# Patient Record
Sex: FEMALE | Race: WHITE | Employment: UNEMPLOYED | URBAN - METROPOLITAN AREA
[De-identification: names, ages, dates, MRNs, and addresses within clinical notes are randomized per-mention and may not be internally consistent; named-entity substitution may affect disease eponyms.]

---

## 2023-05-23 ENCOUNTER — HOSPITAL ENCOUNTER (OUTPATIENT)
Age: 53
Setting detail: SPECIMEN
Discharge: HOME | End: 2023-05-23

## 2023-05-23 LAB
ALBUMIN SERPL-MCNC: 4.4 G/DL (ref 3.5–5.2)
ALBUMIN/GLOB SERPL: 1.6 {RATIO} (ref 1–2.5)
ALP SERPL-CCNC: 99 U/L (ref 35–104)
ALT SERPL-CCNC: 18 U/L (ref 5–33)
ANION GAP SERPL CALCULATED.3IONS-SCNC: 14 MMOL/L (ref 9–17)
AST SERPL-CCNC: 21 U/L
BASOPHILS # BLD: 0.04 K/UL (ref 0–0.2)
BASOPHILS NFR BLD: 1 % (ref 0–2)
BILIRUB SERPL-MCNC: 0.7 MG/DL (ref 0.3–1.2)
BUN SERPL-MCNC: 16 MG/DL (ref 6–20)
CALCIUM SERPL-MCNC: 9.9 MG/DL (ref 8.6–10.4)
CHLORIDE SERPL-SCNC: 107 MMOL/L (ref 98–107)
CHOLEST SERPL-MCNC: 244 MG/DL
CHOLESTEROL/HDL RATIO: 3.2
CO2 SERPL-SCNC: 21 MMOL/L (ref 20–31)
CREAT SERPL-MCNC: 1.08 MG/DL (ref 0.5–0.9)
EOSINOPHIL # BLD: 0.08 K/UL (ref 0–0.44)
EOSINOPHILS RELATIVE PERCENT: 2 % (ref 1–4)
ERYTHROCYTE [DISTWIDTH] IN BLOOD BY AUTOMATED COUNT: 13.1 % (ref 11.8–14.4)
GFR SERPL CREATININE-BSD FRML MDRD: >60 ML/MIN/1.73M2
GLUCOSE SERPL-MCNC: 83 MG/DL (ref 70–99)
HCT VFR BLD AUTO: 48 % (ref 36.3–47.1)
HDLC SERPL-MCNC: 76 MG/DL
HGB BLD-MCNC: 15.5 G/DL (ref 11.9–15.1)
IMM GRANULOCYTES # BLD AUTO: <0.03 K/UL (ref 0–0.3)
IMM GRANULOCYTES NFR BLD: 0 %
LDLC SERPL CALC-MCNC: 151 MG/DL (ref 0–130)
LYMPHOCYTES # BLD: 33 % (ref 24–43)
LYMPHOCYTES NFR BLD: 1.25 K/UL (ref 1.1–3.7)
MAGNESIUM SERPL-MCNC: 2.3 MG/DL (ref 1.6–2.6)
MCH RBC QN AUTO: 29.5 PG (ref 25.2–33.5)
MCHC RBC AUTO-ENTMCNC: 32.3 G/DL (ref 28.4–34.8)
MCV RBC AUTO: 91.3 FL (ref 82.6–102.9)
MONOCYTES NFR BLD: 0.34 K/UL (ref 0.1–1.2)
MONOCYTES NFR BLD: 9 % (ref 3–12)
NEUTROPHILS NFR BLD: 55 % (ref 36–65)
NEUTS SEG NFR BLD: 2.08 K/UL (ref 1.5–8.1)
NRBC AUTOMATED: 0 PER 100 WBC
PLATELET # BLD AUTO: 231 K/UL (ref 138–453)
PMV BLD AUTO: 9.9 FL (ref 8.1–13.5)
POTASSIUM SERPL-SCNC: 4.4 MMOL/L (ref 3.7–5.3)
PROT SERPL-MCNC: 7.2 G/DL (ref 6.4–8.3)
RBC # BLD AUTO: 5.26 M/UL (ref 3.95–5.11)
SODIUM SERPL-SCNC: 142 MMOL/L (ref 135–144)
TRIGL SERPL-MCNC: 83 MG/DL
WBC OTHER # BLD: 3.8 K/UL (ref 3.5–11.3)

## 2023-08-14 DIAGNOSIS — G43.119 INTRACTABLE MIGRAINE WITH AURA WITHOUT STATUS MIGRAINOSUS: ICD-10-CM

## 2023-08-14 DIAGNOSIS — M51.26 PROTRUDED LUMBAR DISC: ICD-10-CM

## 2023-08-14 DIAGNOSIS — I10 ESSENTIAL HYPERTENSION: Primary | ICD-10-CM

## 2023-08-14 DIAGNOSIS — Q76.49 SPINAL ASYMMETRY (< 10 DEGREES): ICD-10-CM

## 2023-08-14 DIAGNOSIS — G43.109 MIGRAINE AURA WITHOUT HEADACHE: ICD-10-CM

## 2023-08-14 RX ORDER — LOSARTAN POTASSIUM 100 MG/1
100 TABLET ORAL DAILY
COMMUNITY
End: 2023-08-17 | Stop reason: SDUPTHER

## 2023-08-14 RX ORDER — LANOLIN ALCOHOL/MO/W.PET/CERES
400 CREAM (GRAM) TOPICAL DAILY
COMMUNITY
End: 2023-08-17 | Stop reason: SDUPTHER

## 2023-08-17 PROBLEM — M51.26 PROTRUDED LUMBAR DISC: Status: ACTIVE | Noted: 2023-08-17

## 2023-08-17 PROBLEM — Q76.49 SPINAL ASYMMETRY (< 10 DEGREES): Status: ACTIVE | Noted: 2023-08-17

## 2023-08-17 PROBLEM — G43.119 INTRACTABLE MIGRAINE WITH AURA WITHOUT STATUS MIGRAINOSUS: Status: ACTIVE | Noted: 2023-08-17

## 2023-08-17 PROBLEM — G43.109 MIGRAINE AURA WITHOUT HEADACHE: Status: ACTIVE | Noted: 2023-08-17

## 2023-08-17 RX ORDER — LANOLIN ALCOHOL/MO/W.PET/CERES
400 CREAM (GRAM) TOPICAL DAILY
Qty: 90 TABLET | Refills: 0 | Status: SHIPPED | OUTPATIENT
Start: 2023-08-17

## 2023-08-17 RX ORDER — LOSARTAN POTASSIUM 100 MG/1
100 TABLET ORAL DAILY
Qty: 90 TABLET | Refills: 0 | Status: SHIPPED | OUTPATIENT
Start: 2023-08-17

## 2023-08-17 RX ORDER — SUMATRIPTAN 100 MG/1
1 TABLET, FILM COATED ORAL
COMMUNITY
Start: 2023-07-27

## 2023-08-17 RX ORDER — TOPIRAMATE 50 MG/1
50 TABLET, FILM COATED ORAL 2 TIMES DAILY
COMMUNITY
Start: 2023-07-27

## 2023-08-17 NOTE — TELEPHONE ENCOUNTER
Refill sent. Pt was advised to come to the office for follow-up of labs in May, but was not scheduled. Please call pt and schedule her for an appointment for HTN routine follow-up/labs as soon as possible.

## 2023-10-27 ENCOUNTER — OFFICE VISIT (OUTPATIENT)
Age: 53
End: 2023-10-27
Payer: COMMERCIAL

## 2023-10-27 VITALS
RESPIRATION RATE: 16 BRPM | WEIGHT: 118 LBS | HEIGHT: 64 IN | HEART RATE: 62 BPM | TEMPERATURE: 97.7 F | BODY MASS INDEX: 20.14 KG/M2 | SYSTOLIC BLOOD PRESSURE: 151 MMHG | DIASTOLIC BLOOD PRESSURE: 89 MMHG

## 2023-10-27 DIAGNOSIS — Z12.11 SCREENING FOR COLON CANCER: ICD-10-CM

## 2023-10-27 DIAGNOSIS — R19.8 RECTAL PRESSURE: Primary | ICD-10-CM

## 2023-10-27 PROCEDURE — G8420 CALC BMI NORM PARAMETERS: HCPCS

## 2023-10-27 PROCEDURE — 99211 OFF/OP EST MAY X REQ PHY/QHP: CPT

## 2023-10-27 PROCEDURE — G8484 FLU IMMUNIZE NO ADMIN: HCPCS

## 2023-10-27 PROCEDURE — G8427 DOCREV CUR MEDS BY ELIG CLIN: HCPCS

## 2023-10-27 PROCEDURE — 99213 OFFICE O/P EST LOW 20 MIN: CPT

## 2023-10-27 PROCEDURE — 3017F COLORECTAL CA SCREEN DOC REV: CPT

## 2023-10-27 PROCEDURE — 1036F TOBACCO NON-USER: CPT

## 2023-10-27 RX ORDER — VITAMIN B COMPLEX
1000 TABLET ORAL DAILY
COMMUNITY

## 2023-10-27 SDOH — ECONOMIC STABILITY: HOUSING INSECURITY
IN THE LAST 12 MONTHS, WAS THERE A TIME WHEN YOU DID NOT HAVE A STEADY PLACE TO SLEEP OR SLEPT IN A SHELTER (INCLUDING NOW)?: NO

## 2023-10-27 SDOH — ECONOMIC STABILITY: FOOD INSECURITY: WITHIN THE PAST 12 MONTHS, THE FOOD YOU BOUGHT JUST DIDN'T LAST AND YOU DIDN'T HAVE MONEY TO GET MORE.: NEVER TRUE

## 2023-10-27 SDOH — ECONOMIC STABILITY: FOOD INSECURITY: WITHIN THE PAST 12 MONTHS, YOU WORRIED THAT YOUR FOOD WOULD RUN OUT BEFORE YOU GOT MONEY TO BUY MORE.: NEVER TRUE

## 2023-10-27 SDOH — ECONOMIC STABILITY: INCOME INSECURITY: HOW HARD IS IT FOR YOU TO PAY FOR THE VERY BASICS LIKE FOOD, HOUSING, MEDICAL CARE, AND HEATING?: NOT HARD AT ALL

## 2023-10-27 ASSESSMENT — ENCOUNTER SYMPTOMS
ANAL BLEEDING: 0
DIARRHEA: 0
BLOOD IN STOOL: 0
VOMITING: 0
SHORTNESS OF BREATH: 0
RECTAL PAIN: 1
ABDOMINAL PAIN: 0
NAUSEA: 0
CONSTIPATION: 0

## 2023-10-27 ASSESSMENT — PATIENT HEALTH QUESTIONNAIRE - PHQ9
1. LITTLE INTEREST OR PLEASURE IN DOING THINGS: 0
SUM OF ALL RESPONSES TO PHQ QUESTIONS 1-9: 0
SUM OF ALL RESPONSES TO PHQ QUESTIONS 1-9: 0
2. FEELING DOWN, DEPRESSED OR HOPELESS: 0
SUM OF ALL RESPONSES TO PHQ QUESTIONS 1-9: 0
SUM OF ALL RESPONSES TO PHQ9 QUESTIONS 1 & 2: 0
SUM OF ALL RESPONSES TO PHQ QUESTIONS 1-9: 0

## 2023-10-27 NOTE — PROGRESS NOTES
[address] [facility]  [address] [facility] [place]  Phone: [phone]  Fax: [phone]      Date of Visit:  10/31/2023  Patient Name: Jay Jay Upton   Patient :  1970     HPI:     Jay Jay Upton is a 52 y.o. female with a past medication history of HTN, migraines and vitamin D deficiency who presents today to discuss   Chief Complaint   Patient presents with    Hemorrhoids     Burning after BM, not constipated, no bleed seen- started 3 weeks ago, tried Preparation H, do feel a lump there     Patient is complaining of intermittent burning after bowel movements and rectal pain/pressure with prolonged sitting that started about three weeks ago after moving furniture. Symptoms are intermittent and mostly occur during the day. She denies any unintentional weight loss, appetite changes, fevers, chills, abdominal pain, nausea, vomiting, perianal itching, constipation, diarrhea or blood in stool. Other relevant history include two recent long car rides (4 hours +). OTC Preparation H provided mild relief, but symptoms soon reoccurred after she went running. Patient has never had a screening colonoscopy. I personally reviewed the patient's past medical history, current medications, allergies, surgical history, family history and social history. Updates were made as necessary. REVIEW OF SYSTEM      Review of Systems   Constitutional:  Negative for appetite change, chills, fever and unexpected weight change. Respiratory:  Negative for shortness of breath. Cardiovascular:  Negative for chest pain. Gastrointestinal:  Positive for rectal pain (described as intermittent \"burning\" and pressure; uncomfortable to sit). Negative for abdominal pain, anal bleeding, blood in stool, constipation, diarrhea, nausea and vomiting. All other systems reviewed and are negative.       REVIEWED INFORMATION      No Known Allergies    Patient Active

## 2023-10-27 NOTE — PATIENT INSTRUCTIONS
Thank you for following up with us at [facility] outpatient residency clinic! It was a pleasure to meet you today! Our plan is the following:  - I refer you to Dr. Sawyer Washington, Colorectal & General Surgery, to evaluate for hemorrhoids.   - You are also due for a screening colonoscopy which will be included in the referral.  - In the meantime, please continue taking stool softeners and warm sitz baths to help relieve the discomfort. If you have any additional questions or concerns, please call the office ([phone]) and speak to one of the staff. They will triage and forward the message to the doctors! Have a great rest of your day!

## 2023-12-08 ENCOUNTER — HOSPITAL ENCOUNTER (OUTPATIENT)
Age: 53
Setting detail: SPECIMEN
Discharge: HOME | End: 2023-12-08

## 2023-12-08 DIAGNOSIS — E78.2 MIXED HYPERLIPIDEMIA: ICD-10-CM

## 2023-12-08 DIAGNOSIS — N28.9 ABNORMAL RENAL FUNCTION FINDING: ICD-10-CM

## 2023-12-08 DIAGNOSIS — I10 ESSENTIAL HYPERTENSION: ICD-10-CM

## 2023-12-08 LAB
BASOPHILS # BLD: 0.04 K/UL (ref 0–0.2)
BASOPHILS NFR BLD: 1 % (ref 0–2)
EOSINOPHIL # BLD: 0.04 K/UL (ref 0–0.44)
EOSINOPHILS RELATIVE PERCENT: 1 % (ref 1–4)
ERYTHROCYTE [DISTWIDTH] IN BLOOD BY AUTOMATED COUNT: 12.3 % (ref 11.8–14.4)
HCT VFR BLD AUTO: 46.3 % (ref 36.3–47.1)
HGB BLD-MCNC: 15.6 G/DL (ref 11.9–15.1)
IMM GRANULOCYTES # BLD AUTO: <0.03 K/UL (ref 0–0.3)
IMM GRANULOCYTES NFR BLD: 0 %
LYMPHOCYTES NFR BLD: 1.11 K/UL (ref 1.1–3.7)
LYMPHOCYTES RELATIVE PERCENT: 29 % (ref 24–43)
MCH RBC QN AUTO: 30.2 PG (ref 25.2–33.5)
MCHC RBC AUTO-ENTMCNC: 33.7 G/DL (ref 28.4–34.8)
MCV RBC AUTO: 89.6 FL (ref 82.6–102.9)
MONOCYTES NFR BLD: 0.41 K/UL (ref 0.1–1.2)
MONOCYTES NFR BLD: 11 % (ref 3–12)
NEUTROPHILS NFR BLD: 58 % (ref 36–65)
NEUTS SEG NFR BLD: 2.21 K/UL (ref 1.5–8.1)
NRBC BLD-RTO: 0 PER 100 WBC
PLATELET # BLD AUTO: 229 K/UL (ref 138–453)
PMV BLD AUTO: 10.2 FL (ref 8.1–13.5)
RBC # BLD AUTO: 5.17 M/UL (ref 3.95–5.11)
WBC OTHER # BLD: 3.8 K/UL (ref 3.5–11.3)

## 2023-12-09 LAB
ALBUMIN SERPL-MCNC: 4.3 G/DL (ref 3.5–5.2)
ALBUMIN/GLOB SERPL: 1.5 {RATIO} (ref 1–2.5)
ALP SERPL-CCNC: 105 U/L (ref 35–104)
ALT SERPL-CCNC: 24 U/L (ref 5–33)
ANION GAP SERPL CALCULATED.3IONS-SCNC: 10 MMOL/L (ref 9–17)
AST SERPL-CCNC: 27 U/L
BILIRUB SERPL-MCNC: 0.6 MG/DL (ref 0.3–1.2)
BUN SERPL-MCNC: 15 MG/DL (ref 6–20)
CALCIUM SERPL-MCNC: 9.6 MG/DL (ref 8.6–10.4)
CHLORIDE SERPL-SCNC: 104 MMOL/L (ref 98–107)
CHOLEST SERPL-MCNC: 233 MG/DL
CHOLESTEROL/HDL RATIO: 3.1
CO2 SERPL-SCNC: 26 MMOL/L (ref 20–31)
CREAT SERPL-MCNC: 0.9 MG/DL (ref 0.5–0.9)
GFR SERPL CREATININE-BSD FRML MDRD: >60 ML/MIN/1.73M2
GLUCOSE SERPL-MCNC: 88 MG/DL (ref 70–99)
HDLC SERPL-MCNC: 75 MG/DL
LDLC SERPL CALC-MCNC: 144 MG/DL (ref 0–130)
POTASSIUM SERPL-SCNC: 5 MMOL/L (ref 3.7–5.3)
PROT SERPL-MCNC: 7.2 G/DL (ref 6.4–8.3)
SODIUM SERPL-SCNC: 140 MMOL/L (ref 135–144)
TRIGL SERPL-MCNC: 70 MG/DL

## 2023-12-13 ENCOUNTER — PATIENT MESSAGE (OUTPATIENT)
Age: 53
End: 2023-12-13

## 2023-12-13 DIAGNOSIS — E78.2 MIXED HYPERLIPIDEMIA: Primary | ICD-10-CM

## 2023-12-13 RX ORDER — ROSUVASTATIN CALCIUM 5 MG/1
5 TABLET, COATED ORAL NIGHTLY
Qty: 90 TABLET | Refills: 0 | Status: SHIPPED | OUTPATIENT
Start: 2023-12-13 | End: 2024-03-12

## 2023-12-14 PROBLEM — I10 ESSENTIAL HYPERTENSION: Status: ACTIVE | Noted: 2023-12-14

## 2023-12-14 PROBLEM — Z78.9 NONSMOKER: Status: ACTIVE | Noted: 2023-12-14

## 2023-12-14 PROBLEM — E78.2 MIXED HYPERLIPIDEMIA: Status: ACTIVE | Noted: 2023-12-14

## 2023-12-29 PROBLEM — G43.109 MIGRAINE AURA WITHOUT HEADACHE: Status: RESOLVED | Noted: 2023-08-17 | Resolved: 2023-12-29

## 2024-01-29 ENCOUNTER — HOSPITAL ENCOUNTER (OUTPATIENT)
Age: 54
Setting detail: SPECIMEN
Discharge: HOME | End: 2024-01-29

## 2024-01-29 DIAGNOSIS — E78.2 MIXED HYPERLIPIDEMIA: ICD-10-CM

## 2024-01-29 LAB
CHOLEST SERPL-MCNC: 176 MG/DL
CHOLESTEROL/HDL RATIO: 2.1
HDLC SERPL-MCNC: 82 MG/DL
LDLC SERPL CALC-MCNC: 78 MG/DL (ref 0–130)
TRIGL SERPL-MCNC: 80 MG/DL

## 2024-02-06 PROBLEM — K64.4 EXTERNAL HEMORRHOID: Status: ACTIVE | Noted: 2024-02-06

## 2024-02-06 PROBLEM — K56.699 STENOSIS OF COLON (HCC): Status: ACTIVE | Noted: 2024-02-06

## 2024-02-06 PROBLEM — K57.30 SIGMOID DIVERTICULUM: Status: ACTIVE | Noted: 2024-02-06

## 2024-02-07 ENCOUNTER — OFFICE VISIT (OUTPATIENT)
Age: 54
End: 2024-02-07
Payer: COMMERCIAL

## 2024-02-07 VITALS
RESPIRATION RATE: 12 BRPM | DIASTOLIC BLOOD PRESSURE: 91 MMHG | WEIGHT: 125.4 LBS | SYSTOLIC BLOOD PRESSURE: 130 MMHG | BODY MASS INDEX: 21.52 KG/M2 | HEART RATE: 67 BPM

## 2024-02-07 DIAGNOSIS — K57.30 SIGMOID DIVERTICULUM: ICD-10-CM

## 2024-02-07 DIAGNOSIS — I10 ESSENTIAL HYPERTENSION: Primary | ICD-10-CM

## 2024-02-07 DIAGNOSIS — K56.699 STENOSIS OF COLON (HCC): ICD-10-CM

## 2024-02-07 DIAGNOSIS — E78.2 MIXED HYPERLIPIDEMIA: ICD-10-CM

## 2024-02-07 DIAGNOSIS — Z78.9 NONSMOKER: ICD-10-CM

## 2024-02-07 PROCEDURE — 1036F TOBACCO NON-USER: CPT | Performed by: FAMILY MEDICINE

## 2024-02-07 PROCEDURE — 99211 OFF/OP EST MAY X REQ PHY/QHP: CPT | Performed by: STUDENT IN AN ORGANIZED HEALTH CARE EDUCATION/TRAINING PROGRAM

## 2024-02-07 PROCEDURE — G8482 FLU IMMUNIZE ORDER/ADMIN: HCPCS | Performed by: FAMILY MEDICINE

## 2024-02-07 PROCEDURE — G8427 DOCREV CUR MEDS BY ELIG CLIN: HCPCS | Performed by: FAMILY MEDICINE

## 2024-02-07 PROCEDURE — 3080F DIAST BP >= 90 MM HG: CPT | Performed by: STUDENT IN AN ORGANIZED HEALTH CARE EDUCATION/TRAINING PROGRAM

## 2024-02-07 PROCEDURE — 99214 OFFICE O/P EST MOD 30 MIN: CPT | Performed by: STUDENT IN AN ORGANIZED HEALTH CARE EDUCATION/TRAINING PROGRAM

## 2024-02-07 PROCEDURE — 3017F COLORECTAL CA SCREEN DOC REV: CPT | Performed by: FAMILY MEDICINE

## 2024-02-07 PROCEDURE — 3075F SYST BP GE 130 - 139MM HG: CPT | Performed by: STUDENT IN AN ORGANIZED HEALTH CARE EDUCATION/TRAINING PROGRAM

## 2024-02-07 PROCEDURE — G8420 CALC BMI NORM PARAMETERS: HCPCS | Performed by: FAMILY MEDICINE

## 2024-02-07 ASSESSMENT — PATIENT HEALTH QUESTIONNAIRE - PHQ9
2. FEELING DOWN, DEPRESSED OR HOPELESS: 0
SUM OF ALL RESPONSES TO PHQ QUESTIONS 1-9: 0
SUM OF ALL RESPONSES TO PHQ9 QUESTIONS 1 & 2: 0
1. LITTLE INTEREST OR PLEASURE IN DOING THINGS: 0

## 2024-02-07 NOTE — PATIENT INSTRUCTIONS
Thank you for following up with us at [facility] office! It was a pleasure to meet you today!     Our plan is the following:  - We'll try reaching out to Dr. Mckeon' office to get the results of the barium enema, so when we hear back, we'll let you know.     - I want you to measure your BP three times a week. If it's high, I don't want you to measure it again, but let me know if the top number is every >=150 or the bottom number is every >=90. If you feel any symptoms measure your BP and record the reading. If you experience any chest pain, shortness of breath, headache, or nose bleed with a high BP, you can call the emergency line for the office or go to the ED for immediate evaluation.    - Your BP looks great today and your lipid panel has improved on the low dose of Crestor, so I'd like to see you again in 3 months for an annual physical exam.    Your medication list is included in the after visit summary. If you find any differences when compared to your medications at home, please contact the office ([phone]) to let us know.   You can also call the office if you have any additional questions or concerns and speak to one of the staff. They will triage and forward the message to the provider.   Have a great rest of your day!

## 2024-02-07 NOTE — ASSESSMENT & PLAN NOTE
- pt did barium enema on 1/30/24. Will try calling Dr. Mckeon's office to find out results of barium enema

## 2024-02-07 NOTE — ASSESSMENT & PLAN NOTE
- much improved on Crestor 5mg, will continue at this dose  - The 10-year ASCVD risk score (Rylee HOUSTON, et al., 2019) is: 1.7%

## 2024-02-07 NOTE — ASSESSMENT & PLAN NOTE
- elevated readings in office, but pt brought in BP cuff and readings are comparable to office BP cuff readings; BPs at home are at goal  - no change to medication regimen at this time. Pt to measure BP 3x/wk, given parameters in AVS

## 2024-02-07 NOTE — PROGRESS NOTES
[facility]  [address] [place] [address]  Phone: [phone]  Fax: [phone]      Date of Visit: 24  Patient Name: Razia Harper   Patient :  1970     ASSESSMENT/PLAN   1. Essential hypertension  Assessment & Plan:  - elevated readings in office, but pt brought in BP cuff and readings are comparable to office BP cuff readings; BPs at home are at goal  - no change to medication regimen at this time. Pt to measure BP 3x/wk, given parameters in AVS  2. Mixed hyperlipidemia  Assessment & Plan:  - much improved on Crestor 5mg, will continue at this dose  - The 10-year ASCVD risk score (Rylee DK, et al., 2019) is: 1.7%  3. Stenosis of colon (HCC)  Comments:  - reviewed colonoscopy and barium enema results with patient  - pt will call Dr. Mckeon' office to determine f/u and to discuss results  Assessment & Plan:  - pt did barium enema on 24. Will try calling Dr. Mckeon's office to find out results of barium enema  4. Sigmoid diverticulum  5. Nonsmoker     Return in about 3 months (around 2024) for annual physical.    HPI    Razia Harper is a 53 y.o. female who presents today to discuss   Chief Complaint   Patient presents with    Results     Patient presents to the office today to discuss repeat lipid panel after starting Crestor 5 mg.  Discussed with patient that lipid panel is greatly improved on low-dose Crestor, and ASCVD risk has decreased as well.  Patient agreeable to maintaining on 5 mg dose.    Reviewed colonoscopy results and note from Dr. Mckeon. Pt reports she had barium enema done at [facility] on the same day, has not heard from Dr. Mckeon about results. Advised pt to call Dr. Mckeon' office to ask about results and determine if follow-up appointment is needed.     Hypertension:  The pt is here for a follow up evaluation of blood pressure.   Blood pressure medications include losartan.     Were any of these

## 2024-02-21 DIAGNOSIS — E78.2 MIXED HYPERLIPIDEMIA: ICD-10-CM

## 2024-02-21 DIAGNOSIS — I10 ESSENTIAL HYPERTENSION: ICD-10-CM

## 2024-02-22 RX ORDER — LOSARTAN POTASSIUM 100 MG/1
100 TABLET ORAL DAILY
Qty: 90 TABLET | Refills: 1 | Status: SHIPPED | OUTPATIENT
Start: 2024-02-22

## 2024-02-22 RX ORDER — ROSUVASTATIN CALCIUM 5 MG/1
5 TABLET, COATED ORAL NIGHTLY
Qty: 90 TABLET | Refills: 1 | Status: SHIPPED | OUTPATIENT
Start: 2024-02-22

## 2024-04-02 DIAGNOSIS — G43.109 MIGRAINE AURA WITHOUT HEADACHE: ICD-10-CM

## 2024-04-02 RX ORDER — LANOLIN ALCOHOL/MO/W.PET/CERES
400 CREAM (GRAM) TOPICAL DAILY
Qty: 90 TABLET | Refills: 3 | Status: SHIPPED | OUTPATIENT
Start: 2024-04-02 | End: 2025-03-28

## 2024-08-11 DIAGNOSIS — I10 ESSENTIAL HYPERTENSION: ICD-10-CM

## 2024-08-11 DIAGNOSIS — E78.2 MIXED HYPERLIPIDEMIA: ICD-10-CM

## 2024-08-12 RX ORDER — LOSARTAN POTASSIUM 100 MG/1
100 TABLET ORAL DAILY
Qty: 90 TABLET | Refills: 1 | Status: SHIPPED | OUTPATIENT
Start: 2024-08-12

## 2024-08-12 RX ORDER — ROSUVASTATIN CALCIUM 5 MG/1
5 TABLET, COATED ORAL NIGHTLY
Qty: 90 TABLET | Refills: 1 | Status: SHIPPED | OUTPATIENT
Start: 2024-08-12

## 2024-11-21 DIAGNOSIS — E78.2 MIXED HYPERLIPIDEMIA: ICD-10-CM

## 2024-11-21 DIAGNOSIS — I10 ESSENTIAL HYPERTENSION: ICD-10-CM

## 2024-11-21 RX ORDER — ROSUVASTATIN CALCIUM 5 MG/1
5 TABLET, COATED ORAL NIGHTLY
Qty: 90 TABLET | Refills: 1 | Status: SHIPPED | OUTPATIENT
Start: 2024-11-21

## 2024-11-21 RX ORDER — LOSARTAN POTASSIUM 100 MG/1
100 TABLET ORAL DAILY
Qty: 90 TABLET | Refills: 1 | Status: SHIPPED | OUTPATIENT
Start: 2024-11-21

## 2025-01-10 ENCOUNTER — OFFICE VISIT (OUTPATIENT)
Age: 55
End: 2025-01-10
Payer: COMMERCIAL

## 2025-01-10 VITALS
WEIGHT: 133.4 LBS | SYSTOLIC BLOOD PRESSURE: 133 MMHG | HEART RATE: 69 BPM | BODY MASS INDEX: 22.77 KG/M2 | HEIGHT: 64 IN | DIASTOLIC BLOOD PRESSURE: 96 MMHG | RESPIRATION RATE: 16 BRPM

## 2025-01-10 DIAGNOSIS — E78.2 MIXED HYPERLIPIDEMIA: ICD-10-CM

## 2025-01-10 DIAGNOSIS — G43.119 INTRACTABLE MIGRAINE WITH AURA WITHOUT STATUS MIGRAINOSUS: ICD-10-CM

## 2025-01-10 DIAGNOSIS — I10 ESSENTIAL HYPERTENSION: Primary | ICD-10-CM

## 2025-01-10 DIAGNOSIS — Z78.9 NONSMOKER: ICD-10-CM

## 2025-01-10 PROCEDURE — 99212 OFFICE O/P EST SF 10 MIN: CPT

## 2025-01-10 RX ORDER — RALOXIFENE HYDROCHLORIDE 60 MG/1
60 TABLET, FILM COATED ORAL DAILY
COMMUNITY

## 2025-01-10 SDOH — ECONOMIC STABILITY: FOOD INSECURITY: WITHIN THE PAST 12 MONTHS, THE FOOD YOU BOUGHT JUST DIDN'T LAST AND YOU DIDN'T HAVE MONEY TO GET MORE.: NEVER TRUE

## 2025-01-10 SDOH — ECONOMIC STABILITY: FOOD INSECURITY: WITHIN THE PAST 12 MONTHS, YOU WORRIED THAT YOUR FOOD WOULD RUN OUT BEFORE YOU GOT MONEY TO BUY MORE.: NEVER TRUE

## 2025-01-10 ASSESSMENT — PATIENT HEALTH QUESTIONNAIRE - PHQ9
SUM OF ALL RESPONSES TO PHQ QUESTIONS 1-9: 0
SUM OF ALL RESPONSES TO PHQ QUESTIONS 1-9: 0
SUM OF ALL RESPONSES TO PHQ9 QUESTIONS 1 & 2: 0
SUM OF ALL RESPONSES TO PHQ QUESTIONS 1-9: 0
2. FEELING DOWN, DEPRESSED OR HOPELESS: NOT AT ALL
1. LITTLE INTEREST OR PLEASURE IN DOING THINGS: NOT AT ALL
SUM OF ALL RESPONSES TO PHQ QUESTIONS 1-9: 0

## 2025-01-10 NOTE — PROGRESS NOTES
[facility]  [address] [place] [address]  Phone: [phone]  Fax: [phone]      Date of Visit: 1/10/2025   Patient Name: Razia Harper   Patient :  1970     ASSESSMENT/PLAN   1. Essential hypertension  Assessment & Plan:   Chronic, at goal (stable), continue current treatment plan    Orders:  -     Comprehensive Metabolic Panel; Future  2. Mixed hyperlipidemia  Assessment & Plan:   Chronic, at goal (stable), continue current treatment plan  Orders:  -     Comprehensive Metabolic Panel; Future  -     Lipid Panel; Future  3. Intractable migraine with aura without status migrainosus  Assessment & Plan:   Chronic, stable (at goal), continue current treatment plan  4. Nonsmoker   See communications section to see instructions provided to patient  Return in about 4 weeks (around 2025) for Physical exam.    Patient was seen and discussed with Felix Grant MD.  Electronically signed by Jessica Cummings MD on 1/10/2025 at 1:30 PM  HPI    Razia Harper is a 54 y.o. female,  has a past medical history of High blood pressure, Migraine, and Hillary-menopause. and presents today to discuss Medication Refill and Hypertension       INTERVAL HISTORY  Patient last seen for upper lipidemia and hypertension on 2024.  Lipid panel noted to be greatly improved since starting Crestor.  No concerning symptoms noted blood pressures noted by patient at home to be within normal limits.    PRESENTATION TODAY    Hypertension  Patient reports taking losartan daily.  She denies chest pain, shortness of breath, lightheadedness, dizziness, palpitations, lower extremity edema, orthopnea, presyncope or syncopal events.    Her home blood pressures have been 115-120s/70-80s.    Hyperlipidemia  Patient reports taking Crestor as prescribed daily.  She denies any abdominal pain, nausea, and vomiting.    Intractable migraine with aura without status

## 2025-01-10 NOTE — PATIENT INSTRUCTIONS
Thank you for following up with us at [facility] outpatient residency clinic! It was a pleasure to meet you today!     Today we discussed the below as next steps in your care:  Great job on what you have been doing so far for your health.  Things look great!  Will follow-up in 4 weeks for physical exam we can talk more about what we continue to do to keep you healthy!    LIFESTYLE CHANGES TO HELP IMPROVE YOUR HEALTH      HOW CAN YOU IMPROVE YOUR PHYSICAL ACTIVITY?    Physical activity helps    Improve your sensitivity to insulin and control of blood sugar if you're a diabetic  Lower your cholesterol and blood pressure  You meet your weight loss goals   Decrease your risk for heart disease  You need a minimum of 150 to 180 minutes/week at target heart rate.  Maximum heart rate is 220 - age.  Target heart rate for exercise is 65 to 80% of maximum heart rate.  This is to maintain your present level of fitness.  To lose weight you need up to 300 minutes/week to maintain your metabolism.  Counting your steps is great, but just activity is not as good if your heart rate is not in the target range.    What should your goals be?  The goal is to have regular physical activity combined with dietary changes that lead to stable weight loss  You should try to have a weight-loss goal that is 5-10 % of the your body weight.     What type of exercise should you do?  People who engage in both aerobic and muscle-strengthening forms of exercise are likely to attain the greatest benefit.  Aerobic exercise:   Examples: brisk walking (3 miles per hour or faster), jogging, cycling, yoga, Water aerobics, Bicycling slower than 10 miles per hour, Tennis (doubles), Ballroom dancing, General gardening  As a rule of thumb, a person doing moderate-intensity aerobic activity should be able to talk, but not sing, during the activity  Muscle-strengthening: Strength or resistance training 2 to 3 days per week on nonconsecutive

## 2025-02-28 DIAGNOSIS — I10 ESSENTIAL HYPERTENSION: ICD-10-CM

## 2025-02-28 DIAGNOSIS — E78.2 MIXED HYPERLIPIDEMIA: ICD-10-CM

## 2025-03-04 RX ORDER — LOSARTAN POTASSIUM 100 MG/1
100 TABLET ORAL DAILY
Qty: 90 TABLET | Refills: 1 | Status: SHIPPED | OUTPATIENT
Start: 2025-03-04

## 2025-03-04 RX ORDER — ROSUVASTATIN CALCIUM 5 MG/1
5 TABLET, COATED ORAL NIGHTLY
Qty: 90 TABLET | Refills: 1 | Status: SHIPPED | OUTPATIENT
Start: 2025-03-04

## 2025-09-02 DIAGNOSIS — E78.2 MIXED HYPERLIPIDEMIA: ICD-10-CM

## 2025-09-02 DIAGNOSIS — I10 ESSENTIAL HYPERTENSION: ICD-10-CM

## 2025-09-03 RX ORDER — LOSARTAN POTASSIUM 100 MG/1
100 TABLET ORAL DAILY
Qty: 90 TABLET | Refills: 1 | Status: SHIPPED | OUTPATIENT
Start: 2025-09-03

## 2025-09-03 RX ORDER — ROSUVASTATIN CALCIUM 5 MG/1
5 TABLET, COATED ORAL NIGHTLY
Qty: 90 TABLET | Refills: 1 | Status: SHIPPED | OUTPATIENT
Start: 2025-09-03